# Patient Record
Sex: FEMALE | Race: WHITE | NOT HISPANIC OR LATINO
[De-identification: names, ages, dates, MRNs, and addresses within clinical notes are randomized per-mention and may not be internally consistent; named-entity substitution may affect disease eponyms.]

---

## 2021-03-22 ENCOUNTER — TRANSCRIPTION ENCOUNTER (OUTPATIENT)
Age: 71
End: 2021-03-22

## 2021-03-23 ENCOUNTER — APPOINTMENT (OUTPATIENT)
Dept: DISASTER EMERGENCY | Facility: HOSPITAL | Age: 71
End: 2021-03-23

## 2021-03-23 ENCOUNTER — OUTPATIENT (OUTPATIENT)
Dept: OUTPATIENT SERVICES | Facility: HOSPITAL | Age: 71
LOS: 1 days | End: 2021-03-23
Payer: MEDICARE

## 2021-03-23 VITALS
DIASTOLIC BLOOD PRESSURE: 77 MMHG | RESPIRATION RATE: 20 BRPM | SYSTOLIC BLOOD PRESSURE: 159 MMHG | HEART RATE: 69 BPM | OXYGEN SATURATION: 97 % | TEMPERATURE: 99 F

## 2021-03-23 VITALS
WEIGHT: 220.02 LBS | TEMPERATURE: 99 F | HEART RATE: 89 BPM | SYSTOLIC BLOOD PRESSURE: 161 MMHG | DIASTOLIC BLOOD PRESSURE: 85 MMHG | OXYGEN SATURATION: 98 % | HEIGHT: 61.5 IN | RESPIRATION RATE: 18 BRPM

## 2021-03-23 DIAGNOSIS — U07.1 COVID-19: ICD-10-CM

## 2021-03-23 PROCEDURE — M0243: CPT

## 2021-03-23 RX ORDER — SODIUM CHLORIDE 9 MG/ML
250 INJECTION INTRAMUSCULAR; INTRAVENOUS; SUBCUTANEOUS
Refills: 0 | Status: DISCONTINUED | OUTPATIENT
Start: 2021-03-23 | End: 2021-04-06

## 2021-03-23 NOTE — MONOCLONAL ANTIBODY INFUSION - EXAM
CC: Monoclonal Antibody Infusion/COVID 19 Positive  70yFemale with PMH of HTN, HLD, obesity, presenting for antibody infusion after testing positive for COVID on 3/22/21, referred here by Dr. Tenorio. Today pt is complaining of nasal congestion, HA. Pt denies fever, cough, sore throat, malaise, body aches, n/v/d, SOB, loss of taste and smell.     exam/findings:  T(C): 37 (03-23-21 @ 11:14), Max: 37 (03-23-21 @ 11:14)  HR: 89 (03-23-21 @ 11:14) (89 - 89)  BP: 161/85 (03-23-21 @ 11:14) (161/85 - 161/85)  RR: 18 (03-23-21 @ 11:14) (18 - 18)  SpO2: 98% (03-23-21 @ 11:14) (98% - 98%)      PE:   Appearance: NAD	  HEENT:   Normal oral mucosa,   Lymphatic: No lymphadenopathy  Cardiovascular: Normal S1 S2, No JVD, No murmurs, No edema  Respiratory: Lungs clear to auscultation	  Gastrointestinal:  Soft, Non-tender, + BS	  Skin: warm and dry  Neurologic: Non-focal  Extremities: Normal range of motion, no calf tenderness or edema    ASSESSMENT:  Pt is a 69 yo female Covid 19 Positive, referred by Dr. Tenorio who presents to infusion center for Monoclonal antibody infusion (Casirivimab/Imdevimab)  Symptoms/ Criteria: nasal congestion, HA  Risk Profile includes: obesity, HTN    PLAN:  - infusion procedure explained to patient   - Consent for monoclonal antibody infusion obtained   - Risk & benefits discussed/all questions answered  - infuse Casirivimab/Imdevimab 1200mg/1200mg IV over one hour   - observe patient for one hour post infusion        I have reviewed the Casirivimab/Imdevimab Emergency Use Authorization (EAU) and I have provided the patient or patient's caregiver with the following information:  1. FDA has authorized emergency use of Casirivimab/Imdevimab, which is not FDA-approved biologic product.  2. The patient or patient's caregiver has the option to accept or refuse administration of Casirivimab/Imdevimab  3. The significant known and benefits are unknown.  4. Information on available alternative treatments and risks and benefits of those alternatives.    Discharge:  Patient tolerated infusion well denies complaints of chest pain/SOB/dizziness/ palps  Vital signs stable for discharge home  D/C instructions given/ fact sheet included.  Patient to follow-up with PCP as needed.

## 2021-03-24 ENCOUNTER — TRANSCRIPTION ENCOUNTER (OUTPATIENT)
Age: 71
End: 2021-03-24

## 2022-08-04 ENCOUNTER — APPOINTMENT (OUTPATIENT)
Dept: ORTHOPEDIC SURGERY | Facility: CLINIC | Age: 72
End: 2022-08-04

## 2022-08-04 ENCOUNTER — FORM ENCOUNTER (OUTPATIENT)
Age: 72
End: 2022-08-04

## 2022-08-04 VITALS — WEIGHT: 225 LBS | BODY MASS INDEX: 42.48 KG/M2 | HEIGHT: 61 IN

## 2022-08-04 PROCEDURE — 99213 OFFICE O/P EST LOW 20 MIN: CPT

## 2022-08-04 RX ORDER — METHYLPREDNISOLONE 4 MG/1
4 TABLET ORAL
Qty: 1 | Refills: 0 | Status: ACTIVE | COMMUNITY
Start: 2022-08-04 | End: 1900-01-01

## 2022-08-04 NOTE — PHYSICAL EXAM
[Flexion] : flexion [Extension] : extension [Bending to left] : bending to left [Bending to right] : bending to right [] : light touch intact throughout both lower extremities [Absent] : achilles reflex absent

## 2022-08-05 ENCOUNTER — APPOINTMENT (OUTPATIENT)
Dept: MRI IMAGING | Facility: CLINIC | Age: 72
End: 2022-08-05

## 2022-08-05 PROCEDURE — 72148 MRI LUMBAR SPINE W/O DYE: CPT | Mod: MH

## 2022-08-16 ENCOUNTER — APPOINTMENT (OUTPATIENT)
Dept: ORTHOPEDIC SURGERY | Facility: CLINIC | Age: 72
End: 2022-08-16

## 2022-08-16 PROCEDURE — 99213 OFFICE O/P EST LOW 20 MIN: CPT

## 2022-08-16 NOTE — HISTORY OF PRESENT ILLNESS
[Tingling] : tingling [de-identified] : / jy\par Patient Complaint - 2/23/22- Having worsening pain back down legs. No new injury\par 7/26/21- Right leg pain resolved with MDP\par 7/12/21- Left hand doing better after injection. Having pain back of right leg with weight bearing.\par History of Present Illness\par 6/14/21- Has stiffness left hand oh ring finger having hard time making a fist. She is LHD. No actual catching or\par locking. No h/o diabetes\par 1 New Patient / New Injury: [FreeTextEntry1] : l-spine  [FreeTextEntry5] : patient feels that the pain is getting worse and that she has radiating pain in both sides

## 2022-08-24 ENCOUNTER — APPOINTMENT (OUTPATIENT)
Dept: PAIN MANAGEMENT | Facility: CLINIC | Age: 72
End: 2022-08-24

## 2022-08-24 VITALS — WEIGHT: 225 LBS | BODY MASS INDEX: 42.48 KG/M2 | HEIGHT: 61 IN

## 2022-08-24 DIAGNOSIS — Z78.9 OTHER SPECIFIED HEALTH STATUS: ICD-10-CM

## 2022-08-24 PROCEDURE — 20552 NJX 1/MLT TRIGGER POINT 1/2: CPT

## 2022-08-24 PROCEDURE — 99204 OFFICE O/P NEW MOD 45 MIN: CPT | Mod: 25

## 2022-08-24 PROCEDURE — J3490M: CUSTOM

## 2022-08-24 NOTE — PHYSICAL EXAM
[de-identified] : PHYSICAL EXAM\par \par Constitutional: \par Appears well, no apparent distress\par Ability to communicate: Normal\par Respiratory: non-labored breathing\par Skin: no rash noted\par Head: normocephalic, atraumatic\par Neck: no visible thyroid enlargement\par Eyes: extraocular movements intact\par Neurologic: alert and oriented x3\par Psychiatric: normal mood, affect, and behavior\par \par Lumbar Spine: \par Palpation: left and right lumbar paraspinal spasm and left and right lumbar paraspinal tenderness to palpation.\par ROM: Diminished range of motion in all plains.  Patient notes pain with lateral bending to the left and right.\par MMT: Motor exam is 5/5 through out bilateral lower extremities.\par Sensation: Light touch and pain is intact throughout bilateral lower extremities.\par Reflexes: achilles and patella reflexes are intact and  symmetrical.  No sustained clonus.\par Special Testing: Positive kemps maneuver on the left and right side\par \par Assessemnt:\par Lumbar spondylosis (m47.816)\par Myalgia (M79.10)\par \par Plan:\par After discussing various treatment options with the patient including but not limited to oral medications, physical therapy, exercise modalities as well as interventional spinal injections, we have decided with the following plan:\par The patient is presenting with axial lumbar pain that has not responded to three months of conservative therapy including physical therapy or nsaid therapy. The pain is interfering with activities of daily living and functionality.  There is no radicular pain.  The pain is exacerbated by facet loading.  Positive kemps maneuver which is defined by pain reproduction with extension and rotation of the lumbar spine to the affected side.  The patient has not had a vertebral fusion at the levels of the proposed treatment.  There is no unexplained neurologic deficit.  There is no bleeding tendency, unstable medical condition, or systemic infection.  The injection is being performed to diagnose the facet joint as the source of the individuals pain.\par \par The risks, benefits and alternatives of the proposed procedure were explained in detail with the patient.  The risks outlined include but are not limited to infection, bleeding, post dural puncture headache, nerve injury, a temporary increase in pain, failure to resolve symptoms, allergic reaction, symptom recurrence, and possible elevation of blood sugar.  All questions were answered to patient's satisfaction and he/she verbalized an understanding.\par \par Follow up 1-2 weeks post injection foe re-evaluation.\par \par Continue home exercises, stretching, activity modification, physical therapy, and conservative care.\par \par \par \par

## 2022-08-24 NOTE — HISTORY OF PRESENT ILLNESS
[Lower back] : lower back [9] : 9 [8] : 8 [Radiating] : radiating [Shooting] : shooting [Constant] : constant [Standing] : standing [Walking] : walking [Retired] : Work status: retired [de-identified] : pt states she is having pain in the lower back the pain does go into her legs when she stands , but the pain is mainly across the back  [] : no [FreeTextEntry9] : advil  [de-identified] : getting up from sitting  [de-identified] : mri l spine

## 2022-09-06 ENCOUNTER — APPOINTMENT (OUTPATIENT)
Age: 72
End: 2022-09-06

## 2022-09-13 ENCOUNTER — APPOINTMENT (OUTPATIENT)
Dept: ORTHOPEDIC SURGERY | Facility: CLINIC | Age: 72
End: 2022-09-13

## 2022-09-21 ENCOUNTER — APPOINTMENT (OUTPATIENT)
Dept: PAIN MANAGEMENT | Facility: CLINIC | Age: 72
End: 2022-09-21

## 2022-12-13 ENCOUNTER — APPOINTMENT (OUTPATIENT)
Dept: ORTHOPEDIC SURGERY | Facility: CLINIC | Age: 72
End: 2022-12-13

## 2022-12-13 VITALS — WEIGHT: 220 LBS | HEIGHT: 61 IN | BODY MASS INDEX: 41.54 KG/M2

## 2022-12-13 DIAGNOSIS — Z78.9 OTHER SPECIFIED HEALTH STATUS: ICD-10-CM

## 2022-12-13 PROCEDURE — 99213 OFFICE O/P EST LOW 20 MIN: CPT

## 2022-12-13 PROCEDURE — 72100 X-RAY EXAM L-S SPINE 2/3 VWS: CPT

## 2022-12-13 RX ORDER — METHYLPREDNISOLONE 4 MG/1
4 TABLET ORAL
Qty: 1 | Refills: 0 | Status: ACTIVE | COMMUNITY
Start: 2022-12-13 | End: 1900-01-01

## 2022-12-13 NOTE — HISTORY OF PRESENT ILLNESS
[Lower back] : lower back [9] : 9 [7] : 7 [Radiating] : radiating [Tingling] : tingling [de-identified] : / jy\par Patient Complaint - 2/23/22- Having worsening pain back down legs. No new injury\par 7/26/21- Right leg pain resolved with MDP\par 7/12/21- Left hand doing better after injection. Having pain back of right leg with weight bearing.\par History of Present Illness\par 6/14/21- Has stiffness left hand oh ring finger having hard time making a fist. She is LHD. No actual catching or\par locking. No h/o diabetes\par 1 New Patient / New Injury: [] : no [FreeTextEntry1] : l-spine  [FreeTextEntry3] : 2 days [FreeTextEntry5] : patient feels that the pain is getting worse and that she has radiating pain in both sides  [FreeTextEntry7] : b/l legs [de-identified] : getting up from bed

## 2022-12-13 NOTE — PHYSICAL EXAM
[Flexion] : flexion [Extension] : extension [Bending to left] : bending to left [Bending to right] : bending to right [Absent] : achilles reflex absent [] : no swelling [Facet arthropathy] : Facet arthropathy [Disc space narrowing] : Disc space narrowing [Spondylolithesis] : Spondylolithesis [FreeTextEntry1] : advanced multilevel ddd, stenosis

## 2022-12-13 NOTE — REASON FOR VISIT
[FreeTextEntry2] : 12/13/22- Had a stumble at home, caught self but twisted, has sharp pain right buttock down right leg. NO relief with Advil\par 8/16/22- Had MRI: Spinal stenosis worst at L3/4

## 2023-01-05 ENCOUNTER — APPOINTMENT (OUTPATIENT)
Dept: ORTHOPEDIC SURGERY | Facility: CLINIC | Age: 73
End: 2023-01-05
Payer: MEDICARE

## 2023-01-05 VITALS — WEIGHT: 220 LBS | BODY MASS INDEX: 41.54 KG/M2 | HEIGHT: 61 IN

## 2023-01-05 PROCEDURE — 99213 OFFICE O/P EST LOW 20 MIN: CPT | Mod: 25

## 2023-01-05 PROCEDURE — 20552 NJX 1/MLT TRIGGER POINT 1/2: CPT

## 2023-01-05 NOTE — PHYSICAL EXAM
[Flexion] : flexion [Extension] : extension [Bending to left] : bending to left [Bending to right] : bending to right [Absent] : achilles reflex absent [Facet arthropathy] : Facet arthropathy [Disc space narrowing] : Disc space narrowing [Spondylolithesis] : Spondylolithesis [] : no swelling [FreeTextEntry1] : advanced multilevel ddd, stenosis

## 2023-01-05 NOTE — HISTORY OF PRESENT ILLNESS
[Lower back] : lower back [9] : 9 [7] : 7 [Radiating] : radiating [Tingling] : tingling [de-identified] : / jy\par Patient Complaint - 2/23/22- Having worsening pain back down legs. No new injury\par 7/26/21- Right leg pain resolved with MDP\par 7/12/21- Left hand doing better after injection. Having pain back of right leg with weight bearing.\par History of Present Illness\par 6/14/21- Has stiffness left hand oh ring finger having hard time making a fist. She is LHD. No actual catching or\par locking. No h/o diabetes\par 1 New Patient / New Injury: [] : no [FreeTextEntry1] : l-spine  [FreeTextEntry3] : 2 days [FreeTextEntry5] : patient feels that the pain is getting worse and that she has radiating pain in both sides  [FreeTextEntry7] : b/l legs [de-identified] : getting up from bed

## 2023-01-05 NOTE — PROCEDURE
[Trigger point 1-2 muscle groups] : trigger point 1-2 muscle groups [Right] : of the right [Gluteus Rafael muscle] : gluteus rafael muscle [Pain] : pain [Alcohol] : alcohol [Betadine] : betadine [Ethyl Chloride sprayed topically] : ethyl chloride sprayed topically [Sterile technique used] : sterile technique used [___ cc    6mg] :  Betamethasone (Celestone) ~Vcc of 6mg [___ cc    1%] : Lidocaine ~Vcc of 1%

## 2023-01-05 NOTE — REASON FOR VISIT
[FreeTextEntry2] : 1/5/23- No relief from MDP, requesting trigger point injection\par 12/13/22- Had a stumble at home, caught self but twisted, has sharp pain right buttock down right leg. NO relief with Advil\par 8/16/22- Had MRI: Spinal stenosis worst at L3/4

## 2023-04-20 ENCOUNTER — APPOINTMENT (OUTPATIENT)
Dept: ORTHOPEDIC SURGERY | Facility: CLINIC | Age: 73
End: 2023-04-20
Payer: MEDICARE

## 2023-04-20 VITALS — HEIGHT: 61 IN | WEIGHT: 220 LBS | BODY MASS INDEX: 41.54 KG/M2

## 2023-04-20 DIAGNOSIS — M48.061 SPINAL STENOSIS, LUMBAR REGION WITHOUT NEUROGENIC CLAUDICATION: ICD-10-CM

## 2023-04-20 DIAGNOSIS — M47.817 SPONDYLOSIS W/OUT MYELOPATHY OR RADICULOPATHY, LUMBOSACRAL REGION: ICD-10-CM

## 2023-04-20 DIAGNOSIS — G89.29 RADICULOPATHY, LUMBAR REGION: ICD-10-CM

## 2023-04-20 DIAGNOSIS — M54.17 RADICULOPATHY, LUMBOSACRAL REGION: ICD-10-CM

## 2023-04-20 DIAGNOSIS — M51.36 OTHER INTERVERTEBRAL DISC DEGENERATION, LUMBAR REGION: ICD-10-CM

## 2023-04-20 DIAGNOSIS — Z00.00 ENCOUNTER FOR GENERAL ADULT MEDICAL EXAMINATION W/OUT ABNORMAL FINDINGS: ICD-10-CM

## 2023-04-20 DIAGNOSIS — M54.16 RADICULOPATHY, LUMBAR REGION: ICD-10-CM

## 2023-04-20 PROCEDURE — 20552 NJX 1/MLT TRIGGER POINT 1/2: CPT

## 2023-04-20 PROCEDURE — 99213 OFFICE O/P EST LOW 20 MIN: CPT | Mod: 25

## 2023-04-20 RX ORDER — METHYLPREDNISOLONE 4 MG/1
4 TABLET ORAL
Qty: 1 | Refills: 0 | Status: ACTIVE | COMMUNITY
Start: 2023-04-20 | End: 1900-01-01

## 2023-04-20 NOTE — ASSESSMENT
[FreeTextEntry1] : will start on medrol and administer tpi left side.\par pt rx renewed. told her the most predictable outcome for radicular pain is lesi, she does not want

## 2023-04-20 NOTE — PHYSICAL EXAM
[Flexion] : flexion [Extension] : extension [Bending to left] : bending to left [Bending to right] : bending to right [Absent] : achilles reflex absent [Disc space narrowing] : Disc space narrowing [Facet arthropathy] : Facet arthropathy [Spondylolithesis] : Spondylolithesis [] : no swelling [FreeTextEntry1] : advanced multilevel ddd, stenosis

## 2023-04-20 NOTE — PROCEDURE
[Trigger point 1-2 muscle groups] : trigger point 1-2 muscle groups [Left] : of the left [Lumbar paraspinal muscle] : lumbar paraspinal muscle [Pain] : pain [Inflammation] : inflammation [Alcohol] : alcohol [Betadine] : betadine [Ethyl Chloride sprayed topically] : ethyl chloride sprayed topically [Sterile technique used] : sterile technique used [___ cc    6mg] :  Betamethasone (Celestone) ~Vcc of 6mg [___ cc    0.25%] : Bupivacaine (Marcaine) ~Vcc of 0.25%  [] : Patient tolerated procedure well [Call if redness, pain or fever occur] : call if redness, pain or fever occur [Apply ice for 15min out of every hour for the next 12-24 hours as tolerated] : apply ice for 15 minutes out of every hour for the next 12-24 hours as tolerated [Previous OTC use and PT nontherapeutic] : patient has tried OTC's including aspirin, Ibuprofen, Aleve, etc or prescription NSAIDS, and/or exercises at home and/or physical therapy without satisfactory response [Patient had decreased mobility in the joint] : patient had decreased mobility in the joint [Risks, benefits, alternatives discussed / Verbal consent obtained] : the risks benefits, and alternatives have been discussed, and verbal consent was obtained

## 2023-04-20 NOTE — HISTORY OF PRESENT ILLNESS
[Lower back] : lower back [9] : 9 [7] : 7 [Radiating] : radiating [Tingling] : tingling [de-identified] : 4-20-23- since last visit has been in therapy over the last week exacerbation of left leg posterior radicular complaints, ambulating with a limp.  \par \par 1/5/23- No relief from MDP, requesting trigger point injection\par 12/13/22- Had a stumble at home, caught self but twisted, has sharp pain right buttock down right leg. NO relief with Advil\par 8/16/22- Had MRI: Spinal stenosis worst at L3/4\par \par mri lumbar impression from 8/2022:\par 1. Convex right curvature.\par 2. Anterior spurring and bulging at the lower lumbar levels with Modic type 2 endplate change at the anterior \par inferior L3. No fracture.\par 3. L1-L2: Facet hypertrophy and ligamentum flavum hypertrophy.\par 4. L2-L3: Broad bulge, facet hypertrophy, and ligamentum flavum hypertrophy with inferior foraminal stenosis.\par Mild to moderate central stenosis.\par 5. L3-L4: Grade 1 anterior spondylolisthesis. Broad bulge, facet arthrosis, and ligamentum flavum hypertrophy\par with foraminal stenosis. Broad herniation superimposed on the bulge with moderate central stenosis.\par 6. L4-L5: Grade 1 anterior spondylolisthesis. Broad bulge, facet arthrosis, and ligamentum flavum hypertrophy \par with foraminal stenosis right greater than left. Central herniation with moderate central stenosis.\par \par \par Patient Complaint - 2/23/22- Having worsening pain back down legs. No new injury\par 7/26/21- Right leg pain resolved with MDP\par 7/12/21- Left hand doing better after injection. Having pain back of right leg with weight bearing.\par History of Present Illness\par 6/14/21- Has stiffness left hand oh ring finger having hard time making a fist. She is LHD. No actual catching or\par locking. No h/o diabetes\par 1 New Patient / New Injury: [] : no [FreeTextEntry1] : l-spine  [FreeTextEntry3] : 2 days [FreeTextEntry5] : patient feels that the pain is getting worse and that she has radiating pain in both sides  [FreeTextEntry7] : b/l legs [de-identified] : getting up from bed

## 2024-10-04 ENCOUNTER — OFFICE (OUTPATIENT)
Dept: URBAN - METROPOLITAN AREA CLINIC 109 | Facility: CLINIC | Age: 74
Setting detail: OPHTHALMOLOGY
End: 2024-10-04
Payer: MEDICARE

## 2024-10-04 DIAGNOSIS — H40.033: ICD-10-CM

## 2024-10-04 DIAGNOSIS — H25.13: ICD-10-CM

## 2024-10-04 PROCEDURE — 76514 ECHO EXAM OF EYE THICKNESS: CPT | Performed by: OPHTHALMOLOGY

## 2024-10-04 PROCEDURE — 92133 CPTRZD OPH DX IMG PST SGM ON: CPT | Performed by: OPHTHALMOLOGY

## 2024-10-04 PROCEDURE — 92004 COMPRE OPH EXAM NEW PT 1/>: CPT | Performed by: OPHTHALMOLOGY

## 2024-10-04 PROCEDURE — 92020 GONIOSCOPY: CPT | Performed by: OPHTHALMOLOGY

## 2024-10-04 ASSESSMENT — TONOMETRY
OD_IOP_MMHG: 20
OS_IOP_MMHG: 20

## 2024-10-04 ASSESSMENT — REFRACTION_AUTOREFRACTION
OS_SPHERE: +8.75
OS_CYLINDER: -1.75
OD_SPHERE: UNABLE
OS_AXIS: 086

## 2024-10-04 ASSESSMENT — PACHYMETRY
OD_CT_CORRECTION: -5
OS_CT_UM: 588
OD_CT_UM: 612
OS_CT_CORRECTION: -3

## 2024-10-04 ASSESSMENT — REFRACTION_CURRENTRX
OS_AXIS: 061
OS_OVR_VA: 20/
OS_SPHERE: +5.00
OD_OVR_VA: 20/
OD_CYLINDER: -0.50
OD_AXIS: 078
OD_VPRISM_DIRECTION: SV
OS_VPRISM_DIRECTION: SV
OS_CYLINDER: -1.00
OD_SPHERE: +6.25

## 2024-10-04 ASSESSMENT — REFRACTION_MANIFEST
OD_AXIS: 080
OD_CYLINDER: -0.50
OD_SPHERE: +6.00
OS_VA1: 20/NI
OS_AXIS: 060
OS_CYLINDER: -1.00
OD_VA1: 20/NI
OS_SPHERE: +5.00

## 2024-10-04 ASSESSMENT — KERATOMETRY
OS_K2POWER_DIOPTERS: 44.50
OD_K2POWER_DIOPTERS: 43.75
OS_AXISANGLE_DEGREES: 159
OD_AXISANGLE_DEGREES: 003
OD_K1POWER_DIOPTERS: 43.50
OS_K1POWER_DIOPTERS: 44.00

## 2024-10-04 ASSESSMENT — CONFRONTATIONAL VISUAL FIELD TEST (CVF)
OS_FINDINGS: FULL
OD_FINDINGS: FULL

## 2024-10-04 ASSESSMENT — VISUAL ACUITY
OS_BCVA: 20/250
OD_BCVA: 20/50+2

## 2024-11-18 ENCOUNTER — OFFICE (OUTPATIENT)
Dept: URBAN - METROPOLITAN AREA CLINIC 109 | Facility: CLINIC | Age: 74
Setting detail: OPHTHALMOLOGY
End: 2024-11-18
Payer: MEDICARE

## 2024-11-18 DIAGNOSIS — H25.13: ICD-10-CM

## 2024-11-18 DIAGNOSIS — H25.11: ICD-10-CM

## 2024-11-18 PROCEDURE — 99213 OFFICE O/P EST LOW 20 MIN: CPT | Performed by: OPHTHALMOLOGY

## 2024-11-18 PROCEDURE — 92136 OPHTHALMIC BIOMETRY: CPT | Mod: TC | Performed by: OPHTHALMOLOGY

## 2024-11-18 PROCEDURE — 92136 OPHTHALMIC BIOMETRY: CPT | Mod: 26,RT | Performed by: OPHTHALMOLOGY

## 2024-11-18 ASSESSMENT — KERATOMETRY
OD_K1POWER_DIOPTERS: 43.75
OD_AXISANGLE_DEGREES: 180
OD_AXISANGLE_DEGREES: 180
OD_AXISANGLE2_DEGREES: 180
OD_K1K2_AVERAGE: 43.875
OS_AXISANGLE_DEGREES: 157
OS_AXISANGLE_DEGREES: 157
OS_CYLAXISANGLE_DEGREES: 157
OD_CYLAXISANGLE_DEGREES: 180
OS_K1K2_AVERAGE: 44.125
OD_K2POWER_DIOPTERS: 44.00
OS_K2POWER_DIOPTERS: 44.50
OD_K2POWER_DIOPTERS: 44.00
OS_AXISANGLE2_DEGREES: 157
OS_K1POWER_DIOPTERS: 43.75
OS_CYLPOWER_DEGREES: 0.75
OS_K2POWER_DIOPTERS: 44.50
OD_K1POWER_DIOPTERS: 43.75
OS_K1POWER_DIOPTERS: 43.75
OD_CYLPOWER_DEGREES: 0.25

## 2024-11-18 ASSESSMENT — REFRACTION_CURRENTRX
OD_VPRISM_DIRECTION: SV
OS_AXIS: 061
OS_OVR_VA: 20/
OD_OVR_VA: 20/
OD_CYLINDER: -0.50
OS_CYLINDER: -1.00
OS_VPRISM_DIRECTION: SV
OS_SPHERE: +5.00
OD_SPHERE: +6.25
OD_AXIS: 078

## 2024-11-18 ASSESSMENT — VISUAL ACUITY
OD_BCVA: 20/50+2
OS_BCVA: 20/250

## 2024-11-18 ASSESSMENT — REFRACTION_MANIFEST
OS_SPHERE: +5.00
OS_AXIS: 060
OS_VA1: 20/NI
OD_VA1: 20/NI
OD_AXIS: 080
OD_CYLINDER: -0.50
OS_CYLINDER: -1.00
OD_SPHERE: +6.00

## 2024-11-18 ASSESSMENT — REFRACTION_AUTOREFRACTION
OS_CYLINDER: -1.75
OD_SPHERE: UNABLE
OS_AXIS: 090
OS_SPHERE: +6.50

## 2024-11-18 ASSESSMENT — CONFRONTATIONAL VISUAL FIELD TEST (CVF)
OD_FINDINGS: FULL
OS_FINDINGS: FULL

## 2024-12-05 ENCOUNTER — AMBULATORY SURGERY CENTER (OUTPATIENT)
Dept: URBAN - METROPOLITAN AREA SURGERY 19 | Facility: SURGERY | Age: 74
Setting detail: OPHTHALMOLOGY
End: 2024-12-05
Payer: MEDICARE

## 2024-12-05 DIAGNOSIS — H25.11: ICD-10-CM

## 2024-12-05 DIAGNOSIS — H52.211: ICD-10-CM

## 2024-12-05 PROCEDURE — A9270 NON-COVERED ITEM OR SERVICE: HCPCS | Mod: GY | Performed by: OPHTHALMOLOGY

## 2024-12-05 PROCEDURE — 66984 XCAPSL CTRC RMVL W/O ECP: CPT | Mod: RT | Performed by: OPHTHALMOLOGY

## 2024-12-05 PROCEDURE — FEMTO FEMTOSECOND LASER: Mod: GY | Performed by: OPHTHALMOLOGY

## 2024-12-06 ENCOUNTER — RX ONLY (RX ONLY)
Age: 74
End: 2024-12-06

## 2024-12-06 ENCOUNTER — OFFICE (OUTPATIENT)
Dept: URBAN - METROPOLITAN AREA CLINIC 109 | Facility: CLINIC | Age: 74
Setting detail: OPHTHALMOLOGY
End: 2024-12-06
Payer: MEDICARE

## 2024-12-06 DIAGNOSIS — Z96.1: ICD-10-CM

## 2024-12-06 DIAGNOSIS — H25.12: ICD-10-CM

## 2024-12-06 PROCEDURE — 99024 POSTOP FOLLOW-UP VISIT: CPT | Performed by: OPTOMETRIST

## 2024-12-06 ASSESSMENT — REFRACTION_AUTOREFRACTION
OD_SPHERE: UNABLE
OS_CYLINDER: -1.75
OS_AXIS: 090
OS_SPHERE: +6.50

## 2024-12-06 ASSESSMENT — REFRACTION_MANIFEST
OS_SPHERE: +5.00
OD_CYLINDER: -0.50
OD_AXIS: 080
OS_AXIS: 060
OS_CYLINDER: -1.00
OD_VA1: 20/NI
OS_VA1: 20/NI
OD_SPHERE: +6.00

## 2024-12-06 ASSESSMENT — REFRACTION_CURRENTRX
OD_VPRISM_DIRECTION: SV
OD_AXIS: 078
OS_SPHERE: +5.00
OS_VPRISM_DIRECTION: SV
OD_CYLINDER: -0.50
OS_AXIS: 061
OS_OVR_VA: 20/
OD_OVR_VA: 20/
OS_CYLINDER: -1.00
OD_SPHERE: +6.25

## 2024-12-06 ASSESSMENT — KERATOMETRY
OS_K1POWER_DIOPTERS: 43.75
OD_K2POWER_DIOPTERS: 44.00
OS_K2POWER_DIOPTERS: 44.50
OS_AXISANGLE_DEGREES: 157
OD_AXISANGLE_DEGREES: 180
OD_K1POWER_DIOPTERS: 43.75

## 2024-12-06 ASSESSMENT — PACHYMETRY
OD_CT_CORRECTION: -5
OS_CT_CORRECTION: -3
OD_CT_UM: 612
OS_CT_UM: 588

## 2024-12-06 ASSESSMENT — VISUAL ACUITY
OD_BCVA: 20/50+2
OS_BCVA: 20/50

## 2024-12-06 ASSESSMENT — CORNEAL EDEMA CLINICAL DESCRIPTION: OD_CORNEALEDEMA: T

## 2024-12-06 ASSESSMENT — CONFRONTATIONAL VISUAL FIELD TEST (CVF)
OD_FINDINGS: FULL
OS_FINDINGS: FULL

## 2024-12-13 ENCOUNTER — OFFICE (OUTPATIENT)
Dept: URBAN - METROPOLITAN AREA CLINIC 109 | Facility: CLINIC | Age: 74
Setting detail: OPHTHALMOLOGY
End: 2024-12-13
Payer: MEDICARE

## 2024-12-13 DIAGNOSIS — H25.12: ICD-10-CM

## 2024-12-13 DIAGNOSIS — Z96.1: ICD-10-CM

## 2024-12-13 PROCEDURE — 92136 OPHTHALMIC BIOMETRY: CPT | Mod: 26,LT | Performed by: OPHTHALMOLOGY

## 2024-12-13 PROCEDURE — 99024 POSTOP FOLLOW-UP VISIT: CPT | Performed by: OPHTHALMOLOGY

## 2024-12-13 ASSESSMENT — REFRACTION_MANIFEST
OS_VA1: 20/NI
OD_CYLINDER: -0.50
OD_AXIS: 099
OD_SPHERE: +0.75
OS_CYLINDER: -1.00
OS_SPHERE: +5.00
OS_AXIS: 060
OD_VA1: 20/30-

## 2024-12-13 ASSESSMENT — VISUAL ACUITY
OD_BCVA: 20/70+2
OS_BCVA: 20/50

## 2024-12-13 ASSESSMENT — REFRACTION_CURRENTRX
OS_VPRISM_DIRECTION: SV
OS_CYLINDER: -1.00
OD_SPHERE: +6.25
OS_AXIS: 061
OS_OVR_VA: 20/
OD_VPRISM_DIRECTION: SV
OD_OVR_VA: 20/
OD_CYLINDER: -0.50
OD_AXIS: 078
OS_SPHERE: +5.00

## 2024-12-13 ASSESSMENT — REFRACTION_AUTOREFRACTION
OD_CYLINDER: -1.00
OD_SPHERE: +1.25
OD_AXIS: 099
OS_SPHERE: +8.50
OS_AXIS: 084
OS_CYLINDER: -1.75

## 2024-12-13 ASSESSMENT — KERATOMETRY
OD_K2POWER_DIOPTERS: 44.00
OD_K1POWER_DIOPTERS: 43.75
OS_AXISANGLE_DEGREES: 148
OD_AXISANGLE_DEGREES: 023
OS_K1POWER_DIOPTERS: 43.75
OS_K2POWER_DIOPTERS: 44.50

## 2024-12-13 ASSESSMENT — PACHYMETRY
OS_CT_CORRECTION: -3
OD_CT_CORRECTION: -5
OS_CT_UM: 588
OD_CT_UM: 612

## 2024-12-13 ASSESSMENT — CONFRONTATIONAL VISUAL FIELD TEST (CVF)
OS_FINDINGS: FULL
OD_FINDINGS: FULL

## 2024-12-19 ENCOUNTER — AMBULATORY SURGERY CENTER (OUTPATIENT)
Dept: URBAN - METROPOLITAN AREA SURGERY 19 | Facility: SURGERY | Age: 74
Setting detail: OPHTHALMOLOGY
End: 2024-12-19
Payer: MEDICARE

## 2024-12-19 DIAGNOSIS — H52.222: ICD-10-CM

## 2024-12-19 DIAGNOSIS — H25.12: ICD-10-CM

## 2024-12-19 PROCEDURE — FEMTO PRECISION LASER CATARACT SURGERY: Mod: GY,LT | Performed by: OPHTHALMOLOGY

## 2024-12-19 PROCEDURE — 66984 XCAPSL CTRC RMVL W/O ECP: CPT | Mod: 79,LT | Performed by: OPHTHALMOLOGY

## 2024-12-19 PROCEDURE — A9270 NON-COVERED ITEM OR SERVICE: HCPCS | Mod: GY,LT | Performed by: OPHTHALMOLOGY

## 2024-12-20 ENCOUNTER — OFFICE (OUTPATIENT)
Dept: URBAN - METROPOLITAN AREA CLINIC 109 | Facility: CLINIC | Age: 74
Setting detail: OPHTHALMOLOGY
End: 2024-12-20
Payer: MEDICARE

## 2024-12-20 DIAGNOSIS — Z96.1: ICD-10-CM

## 2024-12-20 PROBLEM — H26.491 POSTERIOR CAPSULAR OPACIFICATION; RIGHT EYE: Status: ACTIVE | Noted: 2024-12-13

## 2024-12-20 PROBLEM — H16.222 DRY EYE SYNDROME K SICCA; LEFT EYE: Status: ACTIVE | Noted: 2024-12-20

## 2024-12-20 PROBLEM — H25.12 CATARACT SENILE NUCLEAR SCLEROSIS;  , LEFT EYE: Status: RESOLVED | Noted: 2024-12-06 | Resolved: 2024-12-20

## 2024-12-20 PROCEDURE — 99024 POSTOP FOLLOW-UP VISIT: CPT | Performed by: OPTOMETRIST

## 2024-12-20 ASSESSMENT — KERATOMETRY
OS_K2POWER_DIOPTERS: 44.50
OS_K1POWER_DIOPTERS: 43.75
OD_K1POWER_DIOPTERS: 43.75
OD_AXISANGLE_DEGREES: 023
OD_K2POWER_DIOPTERS: 44.00
OS_AXISANGLE_DEGREES: 148

## 2024-12-20 ASSESSMENT — VISUAL ACUITY
OD_BCVA: 20/40
OS_BCVA: 20/50+1

## 2024-12-20 ASSESSMENT — REFRACTION_CURRENTRX
OS_VPRISM_DIRECTION: SV
OD_OVR_VA: 20/
OD_SPHERE: +6.25
OD_CYLINDER: -0.50
OD_AXIS: 078
OS_CYLINDER: -1.00
OD_VPRISM_DIRECTION: SV
OS_OVR_VA: 20/
OS_SPHERE: +5.00
OS_AXIS: 061

## 2024-12-20 ASSESSMENT — REFRACTION_MANIFEST
OD_VA1: 20/30-
OS_VA1: 20/NI
OD_SPHERE: +0.75
OS_AXIS: 060
OS_SPHERE: +5.00
OD_CYLINDER: -0.50
OS_CYLINDER: -1.00
OD_AXIS: 099

## 2024-12-20 ASSESSMENT — REFRACTION_AUTOREFRACTION
OD_AXIS: 099
OS_CYLINDER: -1.75
OS_AXIS: 084
OD_SPHERE: +1.25
OS_SPHERE: +8.50
OD_CYLINDER: -1.00

## 2024-12-20 ASSESSMENT — CONFRONTATIONAL VISUAL FIELD TEST (CVF)
OS_FINDINGS: FULL
OD_FINDINGS: FULL

## 2024-12-20 ASSESSMENT — TONOMETRY: OS_IOP_MMHG: 20

## 2024-12-20 ASSESSMENT — SUPERFICIAL PUNCTATE KERATITIS (SPK): OS_SPK: 1+

## 2024-12-20 ASSESSMENT — PACHYMETRY
OS_CT_UM: 588
OD_CT_CORRECTION: -5
OD_CT_UM: 612
OS_CT_CORRECTION: -3

## 2025-01-14 ENCOUNTER — OFFICE (OUTPATIENT)
Dept: URBAN - METROPOLITAN AREA CLINIC 109 | Facility: CLINIC | Age: 75
Setting detail: OPHTHALMOLOGY
End: 2025-01-14
Payer: MEDICARE

## 2025-01-14 ENCOUNTER — RX ONLY (RX ONLY)
Age: 75
End: 2025-01-14

## 2025-01-14 DIAGNOSIS — Z96.1: ICD-10-CM

## 2025-01-14 PROCEDURE — 99024 POSTOP FOLLOW-UP VISIT: CPT | Performed by: OPTOMETRIST

## 2025-01-14 ASSESSMENT — PACHYMETRY
OS_CT_UM: 588
OD_CT_CORRECTION: -5
OS_CT_CORRECTION: -3
OD_CT_UM: 612

## 2025-01-14 ASSESSMENT — TONOMETRY
OS_IOP_MMHG: 21
OD_IOP_MMHG: 18

## 2025-01-14 ASSESSMENT — REFRACTION_CURRENTRX
OD_CYLINDER: -0.50
OS_OVR_VA: 20/
OD_AXIS: 078
OS_SPHERE: +5.00
OS_VPRISM_DIRECTION: SV
OS_CYLINDER: -1.00
OD_SPHERE: +6.25
OS_AXIS: 061
OD_VPRISM_DIRECTION: SV
OD_OVR_VA: 20/

## 2025-01-14 ASSESSMENT — REFRACTION_MANIFEST
OD_SPHERE: +0.75
OS_SPHERE: +5.00
OD_AXIS: 099
OD_VA1: 20/30-
OS_CYLINDER: -1.00
OD_CYLINDER: -0.50
OS_AXIS: 060
OS_VA1: 20/NI

## 2025-01-14 ASSESSMENT — REFRACTION_AUTOREFRACTION
OD_AXIS: 094
OS_AXIS: 077
OS_SPHERE: +1.50
OD_CYLINDER: -0.75
OS_CYLINDER: -1.00
OD_SPHERE: +1.25

## 2025-01-14 ASSESSMENT — VISUAL ACUITY
OS_BCVA: 20/30-2
OD_BCVA: 20/30

## 2025-01-14 ASSESSMENT — KERATOMETRY
OD_AXISANGLE_DEGREES: 023
OD_K2POWER_DIOPTERS: 44.00
OS_K1POWER_DIOPTERS: 43.75
OS_AXISANGLE_DEGREES: 148
OD_K1POWER_DIOPTERS: 43.75
OS_K2POWER_DIOPTERS: 44.50

## 2025-01-14 ASSESSMENT — SUPERFICIAL PUNCTATE KERATITIS (SPK)
OS_SPK: T
OD_SPK: T

## 2025-01-14 ASSESSMENT — CONFRONTATIONAL VISUAL FIELD TEST (CVF)
OS_FINDINGS: FULL
OD_FINDINGS: FULL

## 2025-01-31 PROBLEM — H53.001 AMBLYOPIA; RIGHT EYE: Status: ACTIVE | Noted: 2025-01-31

## 2025-01-31 PROBLEM — H16.223 DRY EYE SYNDROME K SICCA; BOTH EYES: Status: ACTIVE | Noted: 2025-01-14

## 2025-01-31 PROBLEM — H10.45 ALLERGIC CONJUNCTIVITIS ; BOTH EYES: Status: ACTIVE | Noted: 2025-01-14

## 2025-03-10 ENCOUNTER — OFFICE (OUTPATIENT)
Dept: URBAN - METROPOLITAN AREA CLINIC 109 | Facility: CLINIC | Age: 75
Setting detail: OPHTHALMOLOGY
End: 2025-03-10
Payer: MEDICARE

## 2025-03-10 DIAGNOSIS — H01.004: ICD-10-CM

## 2025-03-10 DIAGNOSIS — H16.223: ICD-10-CM

## 2025-03-10 DIAGNOSIS — H01.002: ICD-10-CM

## 2025-03-10 DIAGNOSIS — H01.001: ICD-10-CM

## 2025-03-10 DIAGNOSIS — H01.005: ICD-10-CM

## 2025-03-10 PROBLEM — H40.033 NARROW ANGLE GLAUCOMA SUSPECT; BOTH EYES: Status: ACTIVE | Noted: 2025-03-10

## 2025-03-10 PROCEDURE — 99213 OFFICE O/P EST LOW 20 MIN: CPT | Mod: 24 | Performed by: OPHTHALMOLOGY

## 2025-03-10 ASSESSMENT — REFRACTION_AUTOREFRACTION
OS_SPHERE: +1.50
OS_AXIS: 065
OS_CYLINDER: -1.00
OD_SPHERE: +1.00
OD_AXIS: 093
OD_CYLINDER: -0.50

## 2025-03-10 ASSESSMENT — CONFRONTATIONAL VISUAL FIELD TEST (CVF)
OD_FINDINGS: FULL
OS_FINDINGS: FULL

## 2025-03-10 ASSESSMENT — REFRACTION_CURRENTRX
OD_VPRISM_DIRECTION: SV
OS_OVR_VA: 20/
OS_CYLINDER: -1.00
OD_SPHERE: +6.25
OD_OVR_VA: 20/
OS_SPHERE: +5.00
OD_AXIS: 078
OS_AXIS: 061
OD_CYLINDER: -0.50
OS_VPRISM_DIRECTION: SV

## 2025-03-10 ASSESSMENT — KERATOMETRY
OD_AXISANGLE_DEGREES: 023
OS_AXISANGLE_DEGREES: 148
OS_K1POWER_DIOPTERS: 43.75
OS_K2POWER_DIOPTERS: 44.50
OD_K1POWER_DIOPTERS: 43.75
OD_K2POWER_DIOPTERS: 44.00

## 2025-03-10 ASSESSMENT — PACHYMETRY
OS_CT_UM: 588
OD_CT_UM: 612
OD_CT_CORRECTION: -5
OS_CT_CORRECTION: -3

## 2025-03-10 ASSESSMENT — REFRACTION_MANIFEST
OS_CYLINDER: -0.50
OS_VA1: 20/25+
OS_SPHERE: +0.75
OD_VA1: 20/30-
OS_SPHERE: +5.00
OD_VA1: 20/40-
OD_AXIS: 099
OS_AXIS: 060
OD_SPHERE: +0.75
OS_AXIS: 69
OD_SPHERE: +0.50
OS_CYLINDER: -1.00
OD_CYLINDER: -0.50
OS_VA1: 20/NI

## 2025-03-10 ASSESSMENT — VISUAL ACUITY
OD_BCVA: 20/25-1
OS_BCVA: 20/50-

## 2025-03-10 ASSESSMENT — LID EXAM ASSESSMENTS
OD_BLEPHARITIS: RLL RUL 1+
OS_BLEPHARITIS: LLL LUL 1+

## 2025-03-10 ASSESSMENT — SUPERFICIAL PUNCTATE KERATITIS (SPK)
OD_SPK: T
OS_SPK: T

## 2025-07-10 ENCOUNTER — APPOINTMENT (OUTPATIENT)
Dept: ORTHOPEDIC SURGERY | Facility: CLINIC | Age: 75
End: 2025-07-10
Payer: MEDICARE

## 2025-07-10 PROBLEM — M51.369 DISC DEGENERATION, LUMBAR: Status: ACTIVE | Noted: 2022-08-04

## 2025-07-10 PROCEDURE — 99213 OFFICE O/P EST LOW 20 MIN: CPT

## 2025-07-10 RX ORDER — METHYLPREDNISOLONE 4 MG/1
4 TABLET ORAL
Qty: 1 | Refills: 0 | Status: ACTIVE | COMMUNITY
Start: 2025-07-10 | End: 1900-01-01